# Patient Record
(demographics unavailable — no encounter records)

---

## 2025-01-09 NOTE — HEALTH RISK ASSESSMENT
[Good] : ~his/her~  mood as  good [No] : In the past 12 months have you used drugs other than those required for medical reasons? No [No falls in past year] : Patient reported no falls in the past year [0] : 2) Feeling down, depressed, or hopeless: Not at all (0) [PHQ-2 Negative - No further assessment needed] : PHQ-2 Negative - No further assessment needed [Patient reported PAP Smear was normal] : Patient reported PAP Smear was normal [HIV test declined] : HIV test declined [Hepatitis C test declined] : Hepatitis C test declined [None] : None [With Family] : lives with family [Employed] : employed [College] : College [Single] : single [# Of Children ___] : has [unfilled] children [Sexually Active] : sexually active [Feels Safe at Home] : Feels safe at home [Fully functional (bathing, dressing, toileting, transferring, walking, feeding)] : Fully functional (bathing, dressing, toileting, transferring, walking, feeding) [Fully functional (using the telephone, shopping, preparing meals, housekeeping, doing laundry, using] : Fully functional and needs no help or supervision to perform IADLs (using the telephone, shopping, preparing meals, housekeeping, doing laundry, using transportation, managing medications and managing finances) [Smoke Detector] : smoke detector [Carbon Monoxide Detector] : carbon monoxide detector [Safety elements used in home] : safety elements used in home [Seat Belt] :  uses seat belt [Sunscreen] : uses sunscreen [With Patient/Caregiver] : , with patient/caregiver [Reviewed no changes] : Reviewed, no changes [Name: ___] : Health Care Proxy's Name: [unfilled]  [Relationship: ___] : Relationship: [unfilled] [Aggressive treatment] : aggressive treatment [Never] : Never [NO] : No [[id number]] : 0 [Change in mental status noted] : No change in mental status noted [Language] : denies difficulty with language [Behavior] : denies difficulty with behavior [Learning/Retaining New Information] : denies difficulty learning/retaining new information [Handling Complex Tasks] : denies difficulty handling complex tasks [Reasoning] : denies difficulty with reasoning [Spatial Ability and Orientation] : denies difficulty with spatial ability and orientation [High Risk Behavior] : no high risk behavior [Reports changes in hearing] : Reports no changes in hearing [Reports changes in vision] : Reports no changes in vision [Reports changes in dental health] : Reports no changes in dental health [PapSmearDate] : 2023 [PapSmearComments] : f/u with gyn, Dr. Epps [de-identified] : dentist twice a year [AdvancecareDate] : 01/09/2025

## 2025-01-09 NOTE — ASSESSMENT
[FreeTextEntry1] : completed physical exam, blood work and urinalysis ordered today, will follow up accordingly. HCM: due for pap f/u with gyn vaccines up to date with Tdap, flu  COVID-19 vaccine up to date. Advised to get annual booster vaccine. form for HHA pending completion vitals stable, no acute new health concerns today.

## 2025-01-09 NOTE — HISTORY OF PRESENT ILLNESS
[FreeTextEntry1] : comprehensive visit [de-identified] : Ms.Tiakia Mcelroy is a 36 yo female presents today reestablish care and for annual comprehensive exam and labs. Pt reports seeks also titers check, TB check, Hep B check for form completion for work. Pt works as HHA/CD-Pap, and seeks form complete for work. Pt today denies any fever, chills, n/v/c/d.

## 2025-01-09 NOTE — PHYSICAL EXAM
[No Acute Distress] : no acute distress [Well Nourished] : well nourished [PERRL] : pupils equal round and reactive to light [EOMI] : extraocular movements intact [Supple] : supple [Clear to Auscultation] : lungs were clear to auscultation bilaterally [Normal S1, S2] : normal S1 and S2 [No Edema] : there was no peripheral edema [Non Tender] : non-tender [No CVA Tenderness] : no CVA  tenderness [No Spinal Tenderness] : no spinal tenderness [No Rash] : no rash [Normal Gait] : normal gait [Normal Affect] : the affect was normal

## 2025-02-18 NOTE — HISTORY OF PRESENT ILLNESS
[FreeTextEntry1] : form for HHA/CD-Pap completion [de-identified] : Ms Zay Mcelroy is a 36 yo female presents today for form completion for HHA/CD-Pap completion. Pt had recent MMRV titer checks which showed immunity, and QuantiFERON GOLD TB test which was negative.

## 2025-02-18 NOTE — ASSESSMENT
[FreeTextEntry1] : form completed and returned to pt.  Approximately 20 minutes was involved in this patient's care, including but not limited to preparing to see the patient, reviewing and obtaining the past and interval history, including medications, reviewing relevant testing, documenting clinical information, ordering of appropriate medications and testing, and coordinating medical care, including communicating with professionals involved in the care of the patient.